# Patient Record
Sex: FEMALE | Race: OTHER | NOT HISPANIC OR LATINO
[De-identification: names, ages, dates, MRNs, and addresses within clinical notes are randomized per-mention and may not be internally consistent; named-entity substitution may affect disease eponyms.]

---

## 2018-02-26 ENCOUNTER — APPOINTMENT (OUTPATIENT)
Dept: UROLOGY | Facility: CLINIC | Age: 48
End: 2018-02-26

## 2018-02-28 ENCOUNTER — APPOINTMENT (OUTPATIENT)
Dept: PULMONOLOGY | Facility: CLINIC | Age: 48
End: 2018-02-28
Payer: MEDICAID

## 2018-02-28 VITALS
BODY MASS INDEX: 23.92 KG/M2 | WEIGHT: 130 LBS | SYSTOLIC BLOOD PRESSURE: 112 MMHG | DIASTOLIC BLOOD PRESSURE: 78 MMHG | HEART RATE: 82 BPM | OXYGEN SATURATION: 98 % | HEIGHT: 62 IN | TEMPERATURE: 98.3 F

## 2018-02-28 DIAGNOSIS — R05 COUGH: ICD-10-CM

## 2018-02-28 PROCEDURE — 95012 NITRIC OXIDE EXP GAS DETER: CPT

## 2018-02-28 PROCEDURE — 99204 OFFICE O/P NEW MOD 45 MIN: CPT | Mod: 25,GC

## 2018-02-28 PROCEDURE — 94060 EVALUATION OF WHEEZING: CPT

## 2018-02-28 PROCEDURE — 71046 X-RAY EXAM CHEST 2 VIEWS: CPT

## 2018-02-28 RX ORDER — ERGOCALCIFEROL 1.25 MG/1
1.25 MG CAPSULE, LIQUID FILLED ORAL
Qty: 4 | Refills: 0 | Status: ACTIVE | COMMUNITY
Start: 2017-12-16

## 2019-07-18 ENCOUNTER — APPOINTMENT (OUTPATIENT)
Dept: PEDIATRIC ALLERGY IMMUNOLOGY | Facility: CLINIC | Age: 49
End: 2019-07-18

## 2023-02-23 ENCOUNTER — NON-APPOINTMENT (OUTPATIENT)
Age: 53
End: 2023-02-23

## 2023-03-09 ENCOUNTER — APPOINTMENT (OUTPATIENT)
Dept: GYNECOLOGIC ONCOLOGY | Facility: CLINIC | Age: 53
End: 2023-03-09
Payer: MEDICAID

## 2023-03-09 VITALS
HEART RATE: 78 BPM | SYSTOLIC BLOOD PRESSURE: 149 MMHG | HEIGHT: 62 IN | DIASTOLIC BLOOD PRESSURE: 85 MMHG | BODY MASS INDEX: 26.13 KG/M2 | WEIGHT: 142 LBS

## 2023-03-09 DIAGNOSIS — Z80.1 FAMILY HISTORY OF MALIGNANT NEOPLASM OF TRACHEA, BRONCHUS AND LUNG: ICD-10-CM

## 2023-03-09 DIAGNOSIS — Z80.0 FAMILY HISTORY OF MALIGNANT NEOPLASM OF DIGESTIVE ORGANS: ICD-10-CM

## 2023-03-09 DIAGNOSIS — D25.9 LEIOMYOMA OF UTERUS, UNSPECIFIED: ICD-10-CM

## 2023-03-09 DIAGNOSIS — I10 ESSENTIAL (PRIMARY) HYPERTENSION: ICD-10-CM

## 2023-03-09 PROCEDURE — 99205 OFFICE O/P NEW HI 60 MIN: CPT

## 2023-03-09 RX ORDER — ATORVASTATIN CALCIUM 20 MG/1
20 TABLET, FILM COATED ORAL
Refills: 0 | Status: ACTIVE | COMMUNITY

## 2023-03-09 RX ORDER — AZITHROMYCIN 250 MG/1
250 TABLET, FILM COATED ORAL
Qty: 6 | Refills: 0 | Status: DISCONTINUED | COMMUNITY
Start: 2018-02-19 | End: 2023-03-09

## 2023-03-09 RX ORDER — FLUTICASONE PROPIONATE 50 UG/1
50 SPRAY, METERED NASAL DAILY
Qty: 1 | Refills: 1 | Status: DISCONTINUED | COMMUNITY
Start: 2018-02-28 | End: 2023-03-09

## 2023-03-09 RX ORDER — PROMETHAZINE HYDROCHLORIDE AND CODEINE PHOSPHATE 6.25; 1 MG/5ML; MG/5ML
6.25-1 SOLUTION ORAL
Qty: 240 | Refills: 0 | Status: DISCONTINUED | COMMUNITY
Start: 2018-02-19 | End: 2023-03-09

## 2023-03-09 RX ORDER — IPRATROPIUM BROMIDE 17 UG/1
17 AEROSOL, METERED RESPIRATORY (INHALATION)
Qty: 1 | Refills: 1 | Status: DISCONTINUED | COMMUNITY
Start: 2018-02-28 | End: 2023-03-09

## 2023-03-09 RX ORDER — MONTELUKAST 10 MG/1
10 TABLET, FILM COATED ORAL
Qty: 30 | Refills: 0 | Status: DISCONTINUED | COMMUNITY
Start: 2018-02-19 | End: 2023-03-09

## 2023-03-09 NOTE — OB HISTORY
[Total Preg ___] : : [unfilled] [Abortions ___] : [unfilled] (abortions) [Menarche Age ____] : age at menarche was [unfilled]

## 2023-03-10 LAB
CANCER AG125 SERPL-ACNC: 27 U/ML
CEA SERPL-MCNC: 0.6 NG/ML

## 2023-03-13 ENCOUNTER — NON-APPOINTMENT (OUTPATIENT)
Age: 53
End: 2023-03-13

## 2023-03-13 ENCOUNTER — TRANSCRIPTION ENCOUNTER (OUTPATIENT)
Age: 53
End: 2023-03-13

## 2023-03-15 ENCOUNTER — APPOINTMENT (OUTPATIENT)
Dept: MRI IMAGING | Facility: CLINIC | Age: 53
End: 2023-03-15
Payer: MEDICAID

## 2023-03-15 ENCOUNTER — OUTPATIENT (OUTPATIENT)
Dept: OUTPATIENT SERVICES | Facility: HOSPITAL | Age: 53
LOS: 1 days | End: 2023-03-15

## 2023-03-15 PROCEDURE — 72197 MRI PELVIS W/O & W/DYE: CPT | Mod: 26

## 2023-03-16 ENCOUNTER — TRANSCRIPTION ENCOUNTER (OUTPATIENT)
Age: 53
End: 2023-03-16

## 2023-03-16 NOTE — HISTORY OF PRESENT ILLNESS
[FreeTextEntry1] : Ms. PALENCIA is a perimenopausal 52 year old  female, for irregular bleeding, with a new diagnosis of endometrial cancer.  She was trying to conceive about 10 years ago, and underwent multiple retrievals and implantations of embryos with MARY at [facility].  However, each implantation attempt was unsuccessful.  Additionally, she had a known fibroid uterus, requiring multiple hysteroscopic resections. \par \par - TVUS- Uterus- 8.9 x 6.5 x 9.3 cm, EMS- 8.8 mm, stable fibroids\par - Hysteroscopic myomectomy- uterine fibroid- fragments of smooth muscle c/w leiomyoma \par - TVUS- Uterus- 9.2 x 7.5 x 7.7 cm, EMS- 8 mm, fibroid uterus noted\par \par Of note, she multiple embryo saved with MARY.\par \par In , during the COVID pandemic she lost her job and insurance.  She had failed to follow up with a gynecologist since this time.  \par \par In 2023, she went to the clinic at [facility] for an annual exam.  She noted irregular heavy bleeding.  \par \par 2023 EMBx- well differentiated endometrial cancer, MSI-S\par \par 2023- CT AP Multilobulated uterus with numerous nodules, no adenopathy \par \par 23- TVUS- Uterus- 11.1 x 8.9 x 10.6 cm, multiple uterine masses, EMS- 7 mm\par   Right ovary- WNL\par   Left ovary with a 1.9 x 1.9 x 3 cm nonspecific hypoechoic structure\par  \par PSHx- D&C, hysteroscopic myomectomy x 4\par PMHx- HLD\par Family hx of cancer- maternal grandmother with stomach cancer, paternal grandfather with lung cancer\par Social Hx- she currently works in real estate, she lives with her mother who has dementia.\par   \par She has minimal VB.  She denies pelvic pain/pressure. She denies bloating, abdominal distention or change in bowel or urinary habits.  She denies recent weight changes.  She denies nausea/vomiting.  She denies all other associated signs and symptoms.  \par She is here alone today to discuss further surgical management. \par \par HM\par Pap-- negative\par Mammo: - negative as per Patient\par Colonoscopy: never\par \par Self referred\par GYN: [facility]\par PCP: Mable Lee

## 2023-03-16 NOTE — LETTER BODY
[Dear  ___] : Dear  [unfilled], [I had the pleasure of evaluating your patient, [unfilled] for ___] : I had the pleasure of evaluating your patient, [unfilled] for [unfilled]. [Attached please find my note.] : Attached please find my note. [FreeTextEntry2] : She will be scheduled for surgery in the near future and I will keep you updated on her progress. Thank you very much for referring this yolande patient.\par \par

## 2023-03-16 NOTE — PHYSICAL EXAM
[Chaperone Present] : A chaperone was present in the examining room during all aspects of the physical examination [Abnormal] : Bimanual Exam: Abnormal [Normal] : Anus and perineum: Normal sphincter tone, no masses, no prolapse. [de-identified] : enlarged, extends almost to umbilicus [de-identified] : large mobile fibroid uterus almost to umbilicus

## 2023-03-20 ENCOUNTER — TRANSCRIPTION ENCOUNTER (OUTPATIENT)
Age: 53
End: 2023-03-20

## 2023-03-23 ENCOUNTER — NON-APPOINTMENT (OUTPATIENT)
Age: 53
End: 2023-03-23

## 2023-03-23 ENCOUNTER — OUTPATIENT (OUTPATIENT)
Dept: OUTPATIENT SERVICES | Facility: HOSPITAL | Age: 53
LOS: 1 days | End: 2023-03-23
Payer: MEDICAID

## 2023-03-23 DIAGNOSIS — C80.1 MALIGNANT (PRIMARY) NEOPLASM, UNSPECIFIED: ICD-10-CM

## 2023-03-24 ENCOUNTER — RESULT REVIEW (OUTPATIENT)
Age: 53
End: 2023-03-24

## 2023-03-24 PROCEDURE — 88321 CONSLTJ&REPRT SLD PREP ELSWR: CPT

## 2023-03-27 LAB — SURGICAL PATHOLOGY STUDY: SIGNIFICANT CHANGE UP

## 2023-03-28 ENCOUNTER — APPOINTMENT (OUTPATIENT)
Dept: GYNECOLOGIC ONCOLOGY | Facility: CLINIC | Age: 53
End: 2023-03-28
Payer: MEDICAID

## 2023-03-28 ENCOUNTER — NON-APPOINTMENT (OUTPATIENT)
Age: 53
End: 2023-03-28

## 2023-03-28 DIAGNOSIS — C54.1 MALIGNANT NEOPLASM OF ENDOMETRIUM: ICD-10-CM

## 2023-03-28 DIAGNOSIS — D25.9 LEIOMYOMA OF UTERUS, UNSPECIFIED: ICD-10-CM

## 2023-03-28 PROCEDURE — 99213 OFFICE O/P EST LOW 20 MIN: CPT | Mod: 95

## 2023-03-28 NOTE — HISTORY OF PRESENT ILLNESS
[FreeTextEntry1] : Ms. PALENCIA is a perimenopausal 52 year old  with a new diagnosis of endometrial cancer.  \par She was trying to conceive about 10 years ago, and underwent multiple retrievals and implantations of embryos with MARY at [facility].  However, each implantation attempt was unsuccessful.  Additionally, she had a known fibroid uterus, requiring multiple hysteroscopic resections. She still has several embryos in storage. \par \par - TVUS- Uterus- 8.9 x 6.5 x 9.3 cm, EMS- 8.8 mm, stable fibroids\par - Hysteroscopic myomectomy- uterine fibroid- fragments of smooth muscle c/w leiomyoma \par - TVUS- Uterus- 9.2 x 7.5 x 7.7 cm, EMS- 8 mm, fibroid uterus noted\par \par In , during the COVID pandemic she lost her job and insurance.  She had failed to follow up with a gynecologist since this time.  \par In 2023, she went to the clinic at [facility] for an annual exam.  She noted irregular heavy menstrual bleeding.  \par 2023 EMBx- well differentiated endometrial cancer, MSI-S ([facility] path review 3/27/23: confirms FIGO grade 1 endometrioid adenoca, normal MMR protein expression). \par \par 2023- CT AP Multilobulated uterus with numerous nodules, no adenopathy \par 23- TVUS- Uterus- 11.1 x 8.9 x 10.6 cm, multiple uterine masses, EMS- 7 mm\par   Right ovary- WNL\par   Left ovary with a 1.9 x 1.9 x 3 cm nonspecific hypoechoic structure\par 3/9/23 Initial GYN ONC consult: Markers:  = 27, CEA = 0.6\par 3/15/23 MRI pelvis: \par UTERUS: 13 x 8 x 11 cm. Multiple fibroids, none with atypical features. The largest right lower uterine segment intramural fibroid 6.2 x 6.1 cm, left fundal subserosal fibroid 3.2 x 3.1 cm. All fibroids are avidly enhancing.\par ENDOMETRIUM: 3.4 x 2.2 cm intracavitary fibroid.\par JUNCTIONAL ZONE: 6 mm\par RIGHT OVARY: 2.5 x 1.3 cm, normal.\par LEFT OVARY: 3 cm bilobar cyst with thin septation.\par ADNEXA: Within normal limits.\par BLADDER: Within normal limits.\par LYMPH NODES: No pelvic lymphadenopathy.\par ABDOMINAL ORGANS: Within normal limits.\par BOWEL: Within normal limits.\par PERITONEUM: No ascites.\par VESSELS: Within normal limits.\par ABDOMINAL WALL: Within normal limits.\par BONES: Within normal limits.\par IMPRESSION:Multi fibroid uterus. 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  She denies pelvic pain/pressure. She denies bloating, abdominal distention or change in bowel or urinary habits.  She denies recent weight changes.  She denies nausea/vomiting.  She denies all other associated signs and symptoms.  \par She opted to have a telehealth discussion visit today for followup due to many additional questions prior to planned surgery on 23.\par \par HM:\par Pap-- negative\par Mammo: - negative as per Patient\par Colonoscopy: none; aware of recs\par \par Self referred\par GYN: [facility]\par PCP: Dr. Mable Lee [Other Location: e.g. School (Enter Location, City,State)___] : at [unfilled], at the time of the visit. [Medical Office: ([facility])___] : at the medical office located in  [Verbal consent obtained from patient] : the patient, [unfilled]

## 2023-03-28 NOTE — LETTER BODY
[Dear  ___] : Dear  [unfilled], [I recently saw our patient [unfilled] for a follow-up visit.] : I recently saw our patient, [unfilled] for a follow-up visit. [Attached please find my note.] : Attached please find my note. [FreeTextEntry2] : She will be having surgery in the near future and I will keep you updated on her progress. Thank you again for referring this yolande patient.\par \par  [FreeTextEntry1] : MRI, markers, path review

## 2023-03-30 ENCOUNTER — NON-APPOINTMENT (OUTPATIENT)
Age: 53
End: 2023-03-30

## 2023-04-05 ENCOUNTER — NON-APPOINTMENT (OUTPATIENT)
Age: 53
End: 2023-04-05

## 2023-04-06 ENCOUNTER — NON-APPOINTMENT (OUTPATIENT)
Age: 53
End: 2023-04-06

## 2023-04-10 ENCOUNTER — OUTPATIENT (OUTPATIENT)
Dept: OUTPATIENT SERVICES | Facility: HOSPITAL | Age: 53
LOS: 1 days | End: 2023-04-10

## 2023-04-10 VITALS
OXYGEN SATURATION: 96 % | HEART RATE: 66 BPM | SYSTOLIC BLOOD PRESSURE: 134 MMHG | HEIGHT: 61 IN | RESPIRATION RATE: 16 BRPM | WEIGHT: 139.99 LBS | DIASTOLIC BLOOD PRESSURE: 88 MMHG | TEMPERATURE: 97 F

## 2023-04-10 DIAGNOSIS — Z98.890 OTHER SPECIFIED POSTPROCEDURAL STATES: Chronic | ICD-10-CM

## 2023-04-10 DIAGNOSIS — C54.1 MALIGNANT NEOPLASM OF ENDOMETRIUM: ICD-10-CM

## 2023-04-10 LAB
A1C WITH ESTIMATED AVERAGE GLUCOSE RESULT: 5.6 % — SIGNIFICANT CHANGE UP (ref 4–5.6)
ALBUMIN SERPL ELPH-MCNC: 4.5 G/DL — SIGNIFICANT CHANGE UP (ref 3.3–5)
ALP SERPL-CCNC: 63 U/L — SIGNIFICANT CHANGE UP (ref 40–120)
ALT FLD-CCNC: 13 U/L — SIGNIFICANT CHANGE UP (ref 4–33)
ANION GAP SERPL CALC-SCNC: 11 MMOL/L — SIGNIFICANT CHANGE UP (ref 7–14)
APPEARANCE UR: CLEAR — SIGNIFICANT CHANGE UP
AST SERPL-CCNC: 18 U/L — SIGNIFICANT CHANGE UP (ref 4–32)
BACTERIA # UR AUTO: NEGATIVE — SIGNIFICANT CHANGE UP
BILIRUB SERPL-MCNC: 0.4 MG/DL — SIGNIFICANT CHANGE UP (ref 0.2–1.2)
BILIRUB UR-MCNC: NEGATIVE — SIGNIFICANT CHANGE UP
BLD GP AB SCN SERPL QL: NEGATIVE — SIGNIFICANT CHANGE UP
BUN SERPL-MCNC: 15 MG/DL — SIGNIFICANT CHANGE UP (ref 7–23)
CALCIUM SERPL-MCNC: 9.2 MG/DL — SIGNIFICANT CHANGE UP (ref 8.4–10.5)
CHLORIDE SERPL-SCNC: 101 MMOL/L — SIGNIFICANT CHANGE UP (ref 98–107)
CO2 SERPL-SCNC: 24 MMOL/L — SIGNIFICANT CHANGE UP (ref 22–31)
COLOR SPEC: YELLOW — SIGNIFICANT CHANGE UP
CREAT SERPL-MCNC: 0.65 MG/DL — SIGNIFICANT CHANGE UP (ref 0.5–1.3)
DIFF PNL FLD: ABNORMAL
EGFR: 107 ML/MIN/1.73M2 — SIGNIFICANT CHANGE UP
EPI CELLS # UR: 2 /HPF — SIGNIFICANT CHANGE UP (ref 0–5)
ESTIMATED AVERAGE GLUCOSE: 114 — SIGNIFICANT CHANGE UP
GLUCOSE SERPL-MCNC: 106 MG/DL — HIGH (ref 70–99)
GLUCOSE UR QL: NEGATIVE — SIGNIFICANT CHANGE UP
HCG SERPL-ACNC: <5 MIU/ML — SIGNIFICANT CHANGE UP
HCT VFR BLD CALC: 44.2 % — SIGNIFICANT CHANGE UP (ref 34.5–45)
HGB BLD-MCNC: 14.4 G/DL — SIGNIFICANT CHANGE UP (ref 11.5–15.5)
HYALINE CASTS # UR AUTO: 0 /LPF — SIGNIFICANT CHANGE UP (ref 0–7)
KETONES UR-MCNC: NEGATIVE — SIGNIFICANT CHANGE UP
LEUKOCYTE ESTERASE UR-ACNC: NEGATIVE — SIGNIFICANT CHANGE UP
MCHC RBC-ENTMCNC: 28 PG — SIGNIFICANT CHANGE UP (ref 27–34)
MCHC RBC-ENTMCNC: 32.6 GM/DL — SIGNIFICANT CHANGE UP (ref 32–36)
MCV RBC AUTO: 86 FL — SIGNIFICANT CHANGE UP (ref 80–100)
NITRITE UR-MCNC: NEGATIVE — SIGNIFICANT CHANGE UP
NRBC # BLD: 0 /100 WBCS — SIGNIFICANT CHANGE UP (ref 0–0)
NRBC # FLD: 0 K/UL — SIGNIFICANT CHANGE UP (ref 0–0)
PH UR: 6.5 — SIGNIFICANT CHANGE UP (ref 5–8)
PLATELET # BLD AUTO: 283 K/UL — SIGNIFICANT CHANGE UP (ref 150–400)
POTASSIUM SERPL-MCNC: 4.2 MMOL/L — SIGNIFICANT CHANGE UP (ref 3.5–5.3)
POTASSIUM SERPL-SCNC: 4.2 MMOL/L — SIGNIFICANT CHANGE UP (ref 3.5–5.3)
PROT SERPL-MCNC: 7.4 G/DL — SIGNIFICANT CHANGE UP (ref 6–8.3)
PROT UR-MCNC: ABNORMAL
RBC # BLD: 5.14 M/UL — SIGNIFICANT CHANGE UP (ref 3.8–5.2)
RBC # FLD: 13.7 % — SIGNIFICANT CHANGE UP (ref 10.3–14.5)
RBC CASTS # UR COMP ASSIST: 6 /HPF — HIGH (ref 0–4)
RH IG SCN BLD-IMP: POSITIVE — SIGNIFICANT CHANGE UP
SODIUM SERPL-SCNC: 136 MMOL/L — SIGNIFICANT CHANGE UP (ref 135–145)
SP GR SPEC: 1.03 — SIGNIFICANT CHANGE UP (ref 1.01–1.05)
UROBILINOGEN FLD QL: SIGNIFICANT CHANGE UP
WBC # BLD: 5.05 K/UL — SIGNIFICANT CHANGE UP (ref 3.8–10.5)
WBC # FLD AUTO: 5.05 K/UL — SIGNIFICANT CHANGE UP (ref 3.8–10.5)
WBC UR QL: 3 /HPF — SIGNIFICANT CHANGE UP (ref 0–5)

## 2023-04-10 RX ORDER — ATORVASTATIN CALCIUM 80 MG/1
1 TABLET, FILM COATED ORAL
Refills: 0 | DISCHARGE

## 2023-04-10 NOTE — H&P PST ADULT - PROBLEM SELECTOR PROBLEM 1

## 2023-04-10 NOTE — H&P PST ADULT - NSICDXPASTMEDICALHX_GEN_ALL_CORE_FT
PAST MEDICAL HISTORY:  Hashimoto's disease     HLD (hyperlipidemia)     Malignant neoplasm of endometrium

## 2023-04-10 NOTE — H&P PST ADULT - PROBLEM SELECTOR PLAN 1
Patient scheduled for surgery on: 4/24/23  Patient provided with verbal and written presurgical instructions  Verbalized understanding  with teach back on the following: Famotidine for GI prophylaxis with small sip of water and  Chlorhexidine wash    Patient reminded to obtain Preop COVID PCR 3-5 days prior to DOS, lab directory provided     Problem: Pre-menopausal   Urine specimen cup provided, UCG on DOS

## 2023-04-10 NOTE — H&P PST ADULT - HISTORY OF PRESENT ILLNESS
52 yr old female presents with preop diagnosis malignant neoplasm of endometrium scheduled for exploratory laparotomy, BSO, staging with node dissection possible cystoscopy, patient reports irregular and heavy menstruation, bloating, mid pelvic pain and gassiness;  biopsy revealed endometrial cancer  52 yr old female presents with preop diagnosis malignant neoplasm of endometrium scheduled for exploratory laparotomy, BRANDON, BSO, staging with node dissection possible cystoscopy, patient reports irregular and heavy menstruation, bloating, mid pelvic pain and gassiness;  biopsy revealed endometrial cancer

## 2023-04-10 NOTE — H&P PST ADULT - NSANTHOSAYNRD_GEN_A_CORE
No. LEANN screening performed.  STOP BANG Legend: 0-2 = LOW Risk; 3-4 = INTERMEDIATE Risk; 5-8 = HIGH Risk

## 2023-04-10 NOTE — H&P PST ADULT - NSICDXFAMILYHX_GEN_ALL_CORE_FT
FAMILY HISTORY:  Grandparent  Still living? No  FH: lung cancer, Age at diagnosis: Age Unknown  FH: stomach cancer, Age at diagnosis: Age Unknown

## 2023-04-10 NOTE — H&P PST ADULT - ANESTHESIA, PREVIOUS REACTION, PROFILE
throat hurts, patient reports she has a narrow mouth and narrow throat, per patient she has never been told she is difficult intubation- Mallampati: II  Denies loose teeth, veneers and dentures throat hurts, per patient "narrow mouth and narrow throat" patient reports this she has never been informed by a provider;  has never been told she is difficult intubation- Mallampati: II  Denies loose teeth, veneers and dentures

## 2023-04-11 LAB
CULTURE RESULTS: SIGNIFICANT CHANGE UP
SPECIMEN SOURCE: SIGNIFICANT CHANGE UP

## 2023-04-18 ENCOUNTER — TRANSCRIPTION ENCOUNTER (OUTPATIENT)
Age: 53
End: 2023-04-18

## 2023-04-18 PROBLEM — C54.1 MALIGNANT NEOPLASM OF ENDOMETRIUM: Chronic | Status: ACTIVE | Noted: 2023-04-10

## 2023-04-18 PROBLEM — E06.3 AUTOIMMUNE THYROIDITIS: Chronic | Status: ACTIVE | Noted: 2023-04-10

## 2023-04-18 PROBLEM — E78.5 HYPERLIPIDEMIA, UNSPECIFIED: Chronic | Status: ACTIVE | Noted: 2023-04-10

## 2023-04-21 ENCOUNTER — APPOINTMENT (OUTPATIENT)
Dept: GYNECOLOGIC ONCOLOGY | Facility: CLINIC | Age: 53
End: 2023-04-21

## 2023-04-21 ENCOUNTER — NON-APPOINTMENT (OUTPATIENT)
Age: 53
End: 2023-04-21

## 2023-04-24 ENCOUNTER — APPOINTMENT (OUTPATIENT)
Dept: GYNECOLOGIC ONCOLOGY | Facility: HOSPITAL | Age: 53
End: 2023-04-24

## 2023-05-15 ENCOUNTER — APPOINTMENT (OUTPATIENT)
Dept: PEDIATRIC MEDICAL GENETICS | Facility: CLINIC | Age: 53
End: 2023-05-15

## 2023-05-18 ENCOUNTER — APPOINTMENT (OUTPATIENT)
Dept: GYNECOLOGIC ONCOLOGY | Facility: CLINIC | Age: 53
End: 2023-05-18

## 2023-08-03 ENCOUNTER — APPOINTMENT (OUTPATIENT)
Dept: GYNECOLOGIC ONCOLOGY | Facility: CLINIC | Age: 53
End: 2023-08-03

## 2024-11-27 ENCOUNTER — APPOINTMENT (OUTPATIENT)
Dept: VASCULAR SURGERY | Facility: CLINIC | Age: 54
End: 2024-11-27
Payer: MEDICAID

## 2024-11-27 PROCEDURE — ZZZZZ: CPT

## 2025-01-23 ENCOUNTER — APPOINTMENT (OUTPATIENT)
Age: 55
End: 2025-01-23
Payer: MEDICAID

## 2025-01-23 PROCEDURE — D0330 PANORAMIC RADIOGRAPHIC IMAGE: CPT

## 2025-02-20 ENCOUNTER — APPOINTMENT (OUTPATIENT)
Age: 55
End: 2025-02-20

## 2025-02-20 PROCEDURE — D7210: CPT

## 2025-02-20 PROCEDURE — D9223: CPT

## 2025-02-20 PROCEDURE — D9222: CPT

## 2025-02-27 ENCOUNTER — APPOINTMENT (OUTPATIENT)
Age: 55
End: 2025-02-27
Payer: MEDICAID

## 2025-02-27 PROCEDURE — D0170: CPT | Mod: NC
